# Patient Record
Sex: MALE | Race: OTHER | URBAN - METROPOLITAN AREA
[De-identification: names, ages, dates, MRNs, and addresses within clinical notes are randomized per-mention and may not be internally consistent; named-entity substitution may affect disease eponyms.]

---

## 2017-11-06 ENCOUNTER — INPATIENT (INPATIENT)
Facility: HOSPITAL | Age: 37
LOS: 1 days | Discharge: HOME | End: 2017-11-08
Attending: INTERNAL MEDICINE | Admitting: INTERNAL MEDICINE
Payer: MEDICAID

## 2017-11-06 ENCOUNTER — TRANSCRIPTION ENCOUNTER (OUTPATIENT)
Age: 37
End: 2017-11-06

## 2017-11-06 VITALS
SYSTOLIC BLOOD PRESSURE: 138 MMHG | TEMPERATURE: 98 F | DIASTOLIC BLOOD PRESSURE: 87 MMHG | HEART RATE: 90 BPM | OXYGEN SATURATION: 96 % | HEIGHT: 65.75 IN | RESPIRATION RATE: 21 BRPM | WEIGHT: 220.9 LBS

## 2017-11-06 DIAGNOSIS — N13.30 UNSPECIFIED HYDRONEPHROSIS: ICD-10-CM

## 2017-11-06 DIAGNOSIS — N23 UNSPECIFIED RENAL COLIC: ICD-10-CM

## 2017-11-06 DIAGNOSIS — N13.2 HYDRONEPHROSIS WITH RENAL AND URETERAL CALCULOUS OBSTRUCTION: ICD-10-CM

## 2017-11-06 DIAGNOSIS — N20.0 CALCULUS OF KIDNEY: ICD-10-CM

## 2017-11-06 LAB
ALBUMIN SERPL ELPH-MCNC: 4 G/DL — SIGNIFICANT CHANGE UP (ref 3.3–5)
ALP SERPL-CCNC: 141 U/L — HIGH (ref 40–120)
ALT FLD-CCNC: 153 U/L — HIGH (ref 12–78)
ANION GAP SERPL CALC-SCNC: 8 MMOL/L — SIGNIFICANT CHANGE UP (ref 5–17)
APPEARANCE UR: CLEAR — SIGNIFICANT CHANGE UP
AST SERPL-CCNC: 55 U/L — HIGH (ref 15–37)
BASOPHILS # BLD AUTO: 0.1 K/UL — SIGNIFICANT CHANGE UP (ref 0–0.2)
BASOPHILS NFR BLD AUTO: 0.8 % — SIGNIFICANT CHANGE UP (ref 0–2)
BILIRUB SERPL-MCNC: 0.6 MG/DL — SIGNIFICANT CHANGE UP (ref 0.2–1.2)
BILIRUB UR-MCNC: NEGATIVE — SIGNIFICANT CHANGE UP
BUN SERPL-MCNC: 22 MG/DL — SIGNIFICANT CHANGE UP (ref 7–23)
CALCIUM SERPL-MCNC: 9.1 MG/DL — SIGNIFICANT CHANGE UP (ref 8.5–10.1)
CHLORIDE SERPL-SCNC: 111 MMOL/L — HIGH (ref 96–108)
CO2 SERPL-SCNC: 22 MMOL/L — SIGNIFICANT CHANGE UP (ref 22–31)
COLOR SPEC: YELLOW — SIGNIFICANT CHANGE UP
CREAT SERPL-MCNC: 1.47 MG/DL — HIGH (ref 0.5–1.3)
DIFF PNL FLD: ABNORMAL
EOSINOPHIL # BLD AUTO: 0.1 K/UL — SIGNIFICANT CHANGE UP (ref 0–0.5)
EOSINOPHIL NFR BLD AUTO: 1.2 % — SIGNIFICANT CHANGE UP (ref 0–6)
EPI CELLS # UR: SIGNIFICANT CHANGE UP
GLUCOSE SERPL-MCNC: 104 MG/DL — HIGH (ref 70–99)
GLUCOSE UR QL: NEGATIVE MG/DL — SIGNIFICANT CHANGE UP
HCT VFR BLD CALC: 45.3 % — SIGNIFICANT CHANGE UP (ref 39–50)
HGB BLD-MCNC: 14.9 G/DL — SIGNIFICANT CHANGE UP (ref 13–17)
KETONES UR-MCNC: NEGATIVE — SIGNIFICANT CHANGE UP
LEUKOCYTE ESTERASE UR-ACNC: NEGATIVE — SIGNIFICANT CHANGE UP
LIDOCAIN IGE QN: 100 U/L — SIGNIFICANT CHANGE UP (ref 73–393)
LYMPHOCYTES # BLD AUTO: 1.5 K/UL — SIGNIFICANT CHANGE UP (ref 1–3.3)
LYMPHOCYTES # BLD AUTO: 13.5 % — SIGNIFICANT CHANGE UP (ref 13–44)
MCHC RBC-ENTMCNC: 29.7 PG — SIGNIFICANT CHANGE UP (ref 27–34)
MCHC RBC-ENTMCNC: 32.9 GM/DL — SIGNIFICANT CHANGE UP (ref 32–36)
MCV RBC AUTO: 90.3 FL — SIGNIFICANT CHANGE UP (ref 80–100)
MONOCYTES # BLD AUTO: 0.7 K/UL — SIGNIFICANT CHANGE UP (ref 0–0.9)
MONOCYTES NFR BLD AUTO: 6.4 % — SIGNIFICANT CHANGE UP (ref 2–14)
NEUTROPHILS # BLD AUTO: 8.7 K/UL — HIGH (ref 1.8–7.4)
NEUTROPHILS NFR BLD AUTO: 78 % — HIGH (ref 43–77)
NITRITE UR-MCNC: NEGATIVE — SIGNIFICANT CHANGE UP
PH UR: 5 — SIGNIFICANT CHANGE UP (ref 5–8)
PLATELET # BLD AUTO: 201 K/UL — SIGNIFICANT CHANGE UP (ref 150–400)
POTASSIUM SERPL-MCNC: 4 MMOL/L — SIGNIFICANT CHANGE UP (ref 3.5–5.3)
POTASSIUM SERPL-SCNC: 4 MMOL/L — SIGNIFICANT CHANGE UP (ref 3.5–5.3)
PROT SERPL-MCNC: 7.8 GM/DL — SIGNIFICANT CHANGE UP (ref 6–8.3)
PROT UR-MCNC: NEGATIVE MG/DL — SIGNIFICANT CHANGE UP
RBC # BLD: 5.02 M/UL — SIGNIFICANT CHANGE UP (ref 4.2–5.8)
RBC # FLD: 12.4 % — SIGNIFICANT CHANGE UP (ref 11–15)
SODIUM SERPL-SCNC: 141 MMOL/L — SIGNIFICANT CHANGE UP (ref 135–145)
SP GR SPEC: 1.02 — SIGNIFICANT CHANGE UP (ref 1.01–1.02)
UROBILINOGEN FLD QL: NEGATIVE MG/DL — SIGNIFICANT CHANGE UP
WBC # BLD: 11.2 K/UL — HIGH (ref 3.8–10.5)
WBC # FLD AUTO: 11.2 K/UL — HIGH (ref 3.8–10.5)

## 2017-11-06 PROCEDURE — 74177 CT ABD & PELVIS W/CONTRAST: CPT | Mod: 26

## 2017-11-06 PROCEDURE — 99285 EMERGENCY DEPT VISIT HI MDM: CPT | Mod: 25

## 2017-11-06 RX ORDER — HYDROMORPHONE HYDROCHLORIDE 2 MG/ML
1 INJECTION INTRAMUSCULAR; INTRAVENOUS; SUBCUTANEOUS EVERY 4 HOURS
Qty: 0 | Refills: 0 | Status: DISCONTINUED | OUTPATIENT
Start: 2017-11-06 | End: 2017-11-07

## 2017-11-06 RX ORDER — TAMSULOSIN HYDROCHLORIDE 0.4 MG/1
0.4 CAPSULE ORAL AT BEDTIME
Qty: 0 | Refills: 0 | Status: DISCONTINUED | OUTPATIENT
Start: 2017-11-06 | End: 2017-11-07

## 2017-11-06 RX ORDER — TAMSULOSIN HYDROCHLORIDE 0.4 MG/1
0.4 CAPSULE ORAL ONCE
Qty: 0 | Refills: 0 | Status: COMPLETED | OUTPATIENT
Start: 2017-11-06 | End: 2017-11-06

## 2017-11-06 RX ORDER — IOHEXOL 300 MG/ML
30 INJECTION, SOLUTION INTRAVENOUS ONCE
Qty: 0 | Refills: 0 | Status: COMPLETED | OUTPATIENT
Start: 2017-11-06 | End: 2017-11-06

## 2017-11-06 RX ORDER — MORPHINE SULFATE 50 MG/1
4 CAPSULE, EXTENDED RELEASE ORAL ONCE
Qty: 0 | Refills: 0 | Status: DISCONTINUED | OUTPATIENT
Start: 2017-11-06 | End: 2017-11-06

## 2017-11-06 RX ORDER — HYDROMORPHONE HYDROCHLORIDE 2 MG/ML
1 INJECTION INTRAMUSCULAR; INTRAVENOUS; SUBCUTANEOUS ONCE
Qty: 0 | Refills: 0 | Status: DISCONTINUED | OUTPATIENT
Start: 2017-11-06 | End: 2017-11-06

## 2017-11-06 RX ORDER — CIPROFLOXACIN LACTATE 400MG/40ML
400 VIAL (ML) INTRAVENOUS ONCE
Qty: 0 | Refills: 0 | Status: COMPLETED | OUTPATIENT
Start: 2017-11-06 | End: 2017-11-06

## 2017-11-06 RX ORDER — TAMSULOSIN HYDROCHLORIDE 0.4 MG/1
1 CAPSULE ORAL
Qty: 14 | Refills: 0 | OUTPATIENT
Start: 2017-11-06 | End: 2017-11-20

## 2017-11-06 RX ORDER — SODIUM CHLORIDE 9 MG/ML
1000 INJECTION INTRAMUSCULAR; INTRAVENOUS; SUBCUTANEOUS ONCE
Qty: 0 | Refills: 0 | Status: COMPLETED | OUTPATIENT
Start: 2017-11-06 | End: 2017-11-06

## 2017-11-06 RX ORDER — CIPROFLOXACIN LACTATE 400MG/40ML
VIAL (ML) INTRAVENOUS
Qty: 0 | Refills: 0 | Status: DISCONTINUED | OUTPATIENT
Start: 2017-11-06 | End: 2017-11-07

## 2017-11-06 RX ORDER — KETOROLAC TROMETHAMINE 30 MG/ML
30 SYRINGE (ML) INJECTION ONCE
Qty: 0 | Refills: 0 | Status: DISCONTINUED | OUTPATIENT
Start: 2017-11-06 | End: 2017-11-06

## 2017-11-06 RX ORDER — CIPROFLOXACIN LACTATE 400MG/40ML
400 VIAL (ML) INTRAVENOUS EVERY 12 HOURS
Qty: 0 | Refills: 0 | Status: CANCELLED | OUTPATIENT
Start: 2017-11-07 | End: 2017-11-07

## 2017-11-06 RX ORDER — SODIUM CHLORIDE 9 MG/ML
1000 INJECTION INTRAMUSCULAR; INTRAVENOUS; SUBCUTANEOUS
Qty: 0 | Refills: 0 | Status: DISCONTINUED | OUTPATIENT
Start: 2017-11-06 | End: 2017-11-07

## 2017-11-06 RX ORDER — IBUPROFEN 200 MG
1 TABLET ORAL
Qty: 28 | Refills: 0 | OUTPATIENT
Start: 2017-11-06 | End: 2017-11-13

## 2017-11-06 RX ADMIN — MORPHINE SULFATE 4 MILLIGRAM(S): 50 CAPSULE, EXTENDED RELEASE ORAL at 14:07

## 2017-11-06 RX ADMIN — Medication 200 MILLIGRAM(S): at 21:22

## 2017-11-06 RX ADMIN — HYDROMORPHONE HYDROCHLORIDE 1 MILLIGRAM(S): 2 INJECTION INTRAMUSCULAR; INTRAVENOUS; SUBCUTANEOUS at 18:04

## 2017-11-06 RX ADMIN — MORPHINE SULFATE 4 MILLIGRAM(S): 50 CAPSULE, EXTENDED RELEASE ORAL at 12:32

## 2017-11-06 RX ADMIN — SODIUM CHLORIDE 1000 MILLILITER(S): 9 INJECTION INTRAMUSCULAR; INTRAVENOUS; SUBCUTANEOUS at 12:16

## 2017-11-06 RX ADMIN — TAMSULOSIN HYDROCHLORIDE 0.4 MILLIGRAM(S): 0.4 CAPSULE ORAL at 12:06

## 2017-11-06 RX ADMIN — MORPHINE SULFATE 4 MILLIGRAM(S): 50 CAPSULE, EXTENDED RELEASE ORAL at 11:44

## 2017-11-06 RX ADMIN — MORPHINE SULFATE 4 MILLIGRAM(S): 50 CAPSULE, EXTENDED RELEASE ORAL at 07:53

## 2017-11-06 RX ADMIN — IOHEXOL 30 MILLILITER(S): 300 INJECTION, SOLUTION INTRAVENOUS at 07:57

## 2017-11-06 RX ADMIN — MORPHINE SULFATE 4 MILLIGRAM(S): 50 CAPSULE, EXTENDED RELEASE ORAL at 15:22

## 2017-11-06 RX ADMIN — SODIUM CHLORIDE 100 MILLILITER(S): 9 INJECTION INTRAMUSCULAR; INTRAVENOUS; SUBCUTANEOUS at 18:04

## 2017-11-06 RX ADMIN — HYDROMORPHONE HYDROCHLORIDE 1 MILLIGRAM(S): 2 INJECTION INTRAMUSCULAR; INTRAVENOUS; SUBCUTANEOUS at 21:37

## 2017-11-06 RX ADMIN — HYDROMORPHONE HYDROCHLORIDE 1 MILLIGRAM(S): 2 INJECTION INTRAMUSCULAR; INTRAVENOUS; SUBCUTANEOUS at 22:11

## 2017-11-06 RX ADMIN — TAMSULOSIN HYDROCHLORIDE 0.4 MILLIGRAM(S): 0.4 CAPSULE ORAL at 21:23

## 2017-11-06 RX ADMIN — MORPHINE SULFATE 4 MILLIGRAM(S): 50 CAPSULE, EXTENDED RELEASE ORAL at 10:17

## 2017-11-06 NOTE — CONSULT NOTE ADULT - ASSESSMENT
Left flank pain secondary to left upper 5 mm stone necessitating parenteral medication, admitted for definitive treatment.

## 2017-11-06 NOTE — CONSULT NOTE ADULT - PROBLEM SELECTOR RECOMMENDATION 2
ureteroscopy , possible removal of stone abd insertion of stent.  Medical expulsive therapy ureteroscopy , possible removal of stone abd insertion of stent.  Medical expulsive therapy.  Scheduled for left ureterolithotripsy and insertion of stent tomorrow
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
## 2017-11-06 NOTE — H&P ADULT - NSHPPHYSICALEXAM_GEN_ALL_CORE
GENERAL: NAD well-developed  HEAD:  Atraumatic, Normocephalic  EYES: EOMI, PERRLA, conjunctiva and sclera clear  ENMT: No tonsillar erythema, exudates, or enlargement; Moist mucous membranes, Good dentition, No lesions  NECK: Supple, No JVD, Normal thyroid  NERVOUS SYSTEM:  Alert & Oriented X3, Good concentration; Motor Strength 5/5 B/L upper and lower extremities; DTRs 2+ intact and symmetric  CHEST/LUNG: Clear to percussion bilaterally; No rales, rhonchi, wheezing, or rubs  HEART: Regular rate and rhythm; No murmurs, rubs, or gallops  ABDOMEN: Soft, tender to right flank  Nondistended; Bowel sounds present  EXTREMITIES:  2+ Peripheral Pulses, No clubbing, cyanosis, or edema  LYMPH: No lymphadenopathy   SKIN: No rashes or lesions

## 2017-11-06 NOTE — ED PROVIDER NOTE - PROGRESS NOTE DETAILS
Pt has 5 mm kidney stone, given flomax, and referred to urology. Patient is pain free, feels like he wants to go home.

## 2017-11-06 NOTE — ED ADULT NURSE NOTE - OBJECTIVE STATEMENT
patient is a 37yr old male c/o left abdominal pain that started saturday 1pm that became worst today. denies n/v

## 2017-11-06 NOTE — H&P ADULT - HISTORY OF PRESENT ILLNESS
36 yo gentleman with a pmhx of kidney stones who presents to the ED with L. lower abd/ flank pain. it started yesterday,has been intermittent. Feels similar to prior kidney stone he had on the R. side. No dysuria, no hematuria. No n/v/d, no chest pain, no short of breath.rted yesterday, has been intermittent. Feels similar to prior kidney stone he had on the R. side. No dysuria, no hematuria. No n/v/d, no chest pain, no sob.

## 2017-11-06 NOTE — H&P ADULT - NSHPLABSRESULTS_GEN_ALL_CORE
14.9   11.2  )-----------( 201      ( 06 Nov 2017 07:39 )             45.3   11-06    141  |  111<H>  |  22  ----------------------------<  104<H>  4.0   |  22  |  1.47<H>    Ca    9.1      06 Nov 2017 07:39    TPro  7.8  /  Alb  4.0  /  TBili  0.6  /  DBili  x   /  AST  55<H>  /  ALT  153<H>  /  AlkPhos  141<H>  11-06      < from: CT Abdomen and Pelvis w/ Oral Cont and w/ IV Cont (11.06.17 @ 11:03) >    MPRESSION: Mild left hydronephrosis due to a 5 mm calculus within the   proximal left ureter. Additional 4 mm nonobstructing left renal calculus.

## 2017-11-06 NOTE — ED PROVIDER NOTE - OBJECTIVE STATEMENT
Pt is a 36 yo gentleman with a pmhx of kidney stones who presents to the ED with L. lower abd/ flank pain. it started yesterday, has been intermittent. Feels similar to prior kidney stone he had on the R. side. No dysuria, no hematuria. No n/v/d, no chest pain, no sob.

## 2017-11-07 ENCOUNTER — RESULT REVIEW (OUTPATIENT)
Age: 37
End: 2017-11-07

## 2017-11-07 DIAGNOSIS — Z29.9 ENCOUNTER FOR PROPHYLACTIC MEASURES, UNSPECIFIED: ICD-10-CM

## 2017-11-07 DIAGNOSIS — N17.0 ACUTE KIDNEY FAILURE WITH TUBULAR NECROSIS: ICD-10-CM

## 2017-11-07 LAB
ANION GAP SERPL CALC-SCNC: 10 MMOL/L — SIGNIFICANT CHANGE UP (ref 5–17)
BUN SERPL-MCNC: 17 MG/DL — SIGNIFICANT CHANGE UP (ref 7–23)
CALCIUM SERPL-MCNC: 8.6 MG/DL — SIGNIFICANT CHANGE UP (ref 8.5–10.1)
CHLORIDE SERPL-SCNC: 105 MMOL/L — SIGNIFICANT CHANGE UP (ref 96–108)
CO2 SERPL-SCNC: 22 MMOL/L — SIGNIFICANT CHANGE UP (ref 22–31)
CREAT SERPL-MCNC: 1.52 MG/DL — HIGH (ref 0.5–1.3)
CULTURE RESULTS: NO GROWTH — SIGNIFICANT CHANGE UP
GLUCOSE SERPL-MCNC: 114 MG/DL — HIGH (ref 70–99)
HCT VFR BLD CALC: 37.9 % — LOW (ref 39–50)
HGB BLD-MCNC: 13 G/DL — SIGNIFICANT CHANGE UP (ref 13–17)
MCHC RBC-ENTMCNC: 30.3 PG — SIGNIFICANT CHANGE UP (ref 27–34)
MCHC RBC-ENTMCNC: 34.2 GM/DL — SIGNIFICANT CHANGE UP (ref 32–36)
MCV RBC AUTO: 88.7 FL — SIGNIFICANT CHANGE UP (ref 80–100)
PLATELET # BLD AUTO: 194 K/UL — SIGNIFICANT CHANGE UP (ref 150–400)
POTASSIUM SERPL-MCNC: 3.6 MMOL/L — SIGNIFICANT CHANGE UP (ref 3.5–5.3)
POTASSIUM SERPL-SCNC: 3.6 MMOL/L — SIGNIFICANT CHANGE UP (ref 3.5–5.3)
RBC # BLD: 4.28 M/UL — SIGNIFICANT CHANGE UP (ref 4.2–5.8)
RBC # FLD: 12.8 % — SIGNIFICANT CHANGE UP (ref 11–15)
SODIUM SERPL-SCNC: 137 MMOL/L — SIGNIFICANT CHANGE UP (ref 135–145)
SPECIMEN SOURCE: SIGNIFICANT CHANGE UP
WBC # BLD: 8.3 K/UL — SIGNIFICANT CHANGE UP (ref 3.8–10.5)
WBC # FLD AUTO: 8.3 K/UL — SIGNIFICANT CHANGE UP (ref 3.8–10.5)

## 2017-11-07 PROCEDURE — 99232 SBSQ HOSP IP/OBS MODERATE 35: CPT

## 2017-11-07 PROCEDURE — 88300 SURGICAL PATH GROSS: CPT | Mod: 26

## 2017-11-07 RX ORDER — ACETAMINOPHEN 500 MG
650 TABLET ORAL ONCE
Qty: 0 | Refills: 0 | Status: COMPLETED | OUTPATIENT
Start: 2017-11-07 | End: 2017-11-07

## 2017-11-07 RX ORDER — ACETAMINOPHEN 500 MG
650 TABLET ORAL EVERY 6 HOURS
Qty: 0 | Refills: 0 | Status: DISCONTINUED | OUTPATIENT
Start: 2017-11-07 | End: 2017-11-08

## 2017-11-07 RX ORDER — SODIUM CHLORIDE 9 MG/ML
1000 INJECTION, SOLUTION INTRAVENOUS
Qty: 0 | Refills: 0 | Status: DISCONTINUED | OUTPATIENT
Start: 2017-11-07 | End: 2017-11-07

## 2017-11-07 RX ORDER — HYDROMORPHONE HYDROCHLORIDE 2 MG/ML
1 INJECTION INTRAMUSCULAR; INTRAVENOUS; SUBCUTANEOUS EVERY 4 HOURS
Qty: 0 | Refills: 0 | Status: DISCONTINUED | OUTPATIENT
Start: 2017-11-07 | End: 2017-11-08

## 2017-11-07 RX ORDER — SODIUM CHLORIDE 9 MG/ML
1000 INJECTION INTRAMUSCULAR; INTRAVENOUS; SUBCUTANEOUS
Qty: 0 | Refills: 0 | Status: DISCONTINUED | OUTPATIENT
Start: 2017-11-07 | End: 2017-11-08

## 2017-11-07 RX ORDER — CIPROFLOXACIN LACTATE 400MG/40ML
400 VIAL (ML) INTRAVENOUS EVERY 12 HOURS
Qty: 0 | Refills: 0 | Status: DISCONTINUED | OUTPATIENT
Start: 2017-11-07 | End: 2017-11-08

## 2017-11-07 RX ORDER — FENTANYL CITRATE 50 UG/ML
50 INJECTION INTRAVENOUS
Qty: 0 | Refills: 0 | Status: DISCONTINUED | OUTPATIENT
Start: 2017-11-07 | End: 2017-11-07

## 2017-11-07 RX ORDER — FUROSEMIDE 40 MG
10 TABLET ORAL ONCE
Qty: 0 | Refills: 0 | Status: DISCONTINUED | OUTPATIENT
Start: 2017-11-07 | End: 2017-11-08

## 2017-11-07 RX ORDER — TAMSULOSIN HYDROCHLORIDE 0.4 MG/1
0.4 CAPSULE ORAL AT BEDTIME
Qty: 0 | Refills: 0 | Status: DISCONTINUED | OUTPATIENT
Start: 2017-11-07 | End: 2017-11-08

## 2017-11-07 RX ADMIN — HYDROMORPHONE HYDROCHLORIDE 1 MILLIGRAM(S): 2 INJECTION INTRAMUSCULAR; INTRAVENOUS; SUBCUTANEOUS at 01:20

## 2017-11-07 RX ADMIN — Medication 200 MILLIGRAM(S): at 18:15

## 2017-11-07 RX ADMIN — FENTANYL CITRATE 50 MICROGRAM(S): 50 INJECTION INTRAVENOUS at 12:45

## 2017-11-07 RX ADMIN — Medication 650 MILLIGRAM(S): at 20:12

## 2017-11-07 RX ADMIN — HYDROMORPHONE HYDROCHLORIDE 1 MILLIGRAM(S): 2 INJECTION INTRAMUSCULAR; INTRAVENOUS; SUBCUTANEOUS at 07:24

## 2017-11-07 RX ADMIN — SODIUM CHLORIDE 75 MILLILITER(S): 9 INJECTION, SOLUTION INTRAVENOUS at 12:24

## 2017-11-07 RX ADMIN — Medication 650 MILLIGRAM(S): at 04:07

## 2017-11-07 RX ADMIN — Medication 650 MILLIGRAM(S): at 05:00

## 2017-11-07 RX ADMIN — TAMSULOSIN HYDROCHLORIDE 0.4 MILLIGRAM(S): 0.4 CAPSULE ORAL at 21:49

## 2017-11-07 RX ADMIN — SODIUM CHLORIDE 100 MILLILITER(S): 9 INJECTION INTRAMUSCULAR; INTRAVENOUS; SUBCUTANEOUS at 18:17

## 2017-11-07 RX ADMIN — FENTANYL CITRATE 50 MICROGRAM(S): 50 INJECTION INTRAVENOUS at 12:28

## 2017-11-07 RX ADMIN — HYDROMORPHONE HYDROCHLORIDE 1 MILLIGRAM(S): 2 INJECTION INTRAMUSCULAR; INTRAVENOUS; SUBCUTANEOUS at 06:54

## 2017-11-07 RX ADMIN — HYDROMORPHONE HYDROCHLORIDE 1 MILLIGRAM(S): 2 INJECTION INTRAMUSCULAR; INTRAVENOUS; SUBCUTANEOUS at 00:48

## 2017-11-07 NOTE — PROGRESS NOTE ADULT - PROBLEM SELECTOR PLAN 1
post op Ureteroscopy of left ureter with lithotripsy, cont with IVF and IV abx, will f/u renal function and Urology recommendations.

## 2017-11-07 NOTE — BRIEF OPERATIVE NOTE - PROCEDURE
<<-----Click on this checkbox to enter Procedure Ureteroscopy of right ureter with lithotripsy  11/07/2017  removal of stones and insertion of stent  Active  [facility] Ureteroscopy of left ureter with lithotripsy  11/07/2017  removal of stones and insertion of stent  Active  [facility]

## 2017-11-07 NOTE — PROGRESS NOTE ADULT - SUBJECTIVE AND OBJECTIVE BOX
Patient is a 37y old  Male who presents with a chief complaint of left flank pain (2017 15:23)      INTERVAL HPI/OVERNIGHT EVENTS: Pt was seen post-op Ureteroscopy of left ureter with lithotripsy, tolerated procedure well, pt has minimal discomfort otherwise he denies any other symptom. indwelling john    MEDICATIONS  (STANDING):  ciprofloxacin   IVPB 400 milliGRAM(s) IV Intermittent every 12 hours  furosemide   Injectable 10 milliGRAM(s) IV Push once  sodium chloride 0.9%. 1000 milliLiter(s) (100 mL/Hr) IV Continuous <Continuous>  tamsulosin 0.4 milliGRAM(s) Oral at bedtime    MEDICATIONS  (PRN):  acetaminophen   Tablet 650 milliGRAM(s) Oral every 6 hours PRN For Temp greater than 38 C (100.4 F)  HYDROmorphone  Injectable 1 milliGRAM(s) IV Push every 4 hours PRN Moderate Pain (4 - 6)      Allergies    No Known Allergies    Intolerances        REVIEW OF SYSTEMS:    CONSTITUTIONAL: No fever, generalized weakness, Fatigue, weight loss  EYES: No eye pain, visual disturbances, or discharge  ENMT:  No difficulty hearing, tinnitus, vertigo; No sinus or throat pain  NECK: No pain or stiffness  RESPIRATORY: No shortness of breath,  cough, wheezing, chills or hemoptysis;   CARDIOVASCULAR: No chest pain, palpitations, or leg swelling  GASTROINTESTINAL: No abdominal pain. No nausea, vomiting, diarrhea or constipation. No melena or hematochezia.  GENITOURINARY: No dysuria, frequency, hematuria, or incontinence  NEUROLOGICAL: No headaches, Dizziness, memory loss, loss of strength, numbness, or tremors  SKIN: No itching, burning, rashes, or lesions   MUSCULOSKELETAL: No joint pain or swelling; No muscle, back, or extremity pain  PSYCHIATRIC: No depression, anxiety, mood swings, or difficulty sleeping  HEME/LYMPH: No easy bruising, or bleeding gums  ALLERGY AND IMMUNOLOGIC: No hives or eczema    Vital Signs Last 24 Hrs  T(C): 38.1 (2017 20:50), Max: 38.1 (2017 19:54)  T(F): 100.6 (2017 20:50), Max: 100.6 (2017 19:54)  HR: 110 (2017 20:50) (80 - 110)  BP: 137/82 (2017 20:50) (117/66 - 148/86)  BP(mean): --  RR: 15 (2017 20:50) (12 - 18)  SpO2: 99% (2017 20:50) (92% - 99%)    PHYSICAL EXAM:    GENERAL: NAD, well-groomed, well-developed  HEAD:  Atraumatic, Normocephalic  EYES: EOMI, PERRLA, conjunctiva and sclera clear  ENMT: Moist mucous membranes  NECK: Supple, No JVD, Normal thyroid  CHEST/LUNG: Clear to percussion bilaterally; No rales, rhonchi, wheezing, or rubs  HEART: Regular rate and rhythm; No murmurs, rubs, or gallops  ABDOMEN: Soft, Nontender, Nondistended; Bowel sounds present  EXTREMITIES:  +john, 2+ Peripheral Pulses, No clubbing, cyanosis, or edema  LYMPH: No lymphadenopathy noted  SKIN: No rashes or lesions  NERVOUS SYSTEM:  Alert & Oriented X3, Good concentration; Motor Strength 5/5 B/L upper and lower extremities; DTRs 2+ intact and symmetric    LABS:                        13.0   8.3   )-----------( 194      ( 2017 08:07 )             37.9         137  |  105  |  17  ----------------------------<  114<H>  3.6   |  22  |  1.52<H>    Ca    8.6      2017 08:05    TPro  7.8  /  Alb  4.0  /  TBili  0.6  /  DBili  x   /  AST  55<H>  /  ALT  153<H>  /  AlkPhos  141<H>        Urinalysis Basic - ( 2017 07:39 )    Color: Yellow / Appearance: Clear / S.020 / pH: x  Gluc: x / Ketone: Negative  / Bili: Negative / Urobili: Negative mg/dL   Blood: x / Protein: Negative mg/dL / Nitrite: Negative   Leuk Esterase: Negative / RBC: x / WBC x   Sq Epi: x / Non Sq Epi: Few / Bacteria: x      CAPILLARY BLOOD GLUCOSE            Culture - Urine (collected 17)  Source: .Urine Clean Catch (Midstream)  Final Report (17):    No growth        RADIOLOGY & ADDITIONAL TESTS:          Imaging Personally Reviewed:  [x ] YES  [ ] NO    Consultant(s) Notes Reviewed:  [x ] YES  [ ] NO    Care Discussed with Consultants/Other Providers [x ] YES  [ ] NO

## 2017-11-08 ENCOUNTER — TRANSCRIPTION ENCOUNTER (OUTPATIENT)
Age: 37
End: 2017-11-08

## 2017-11-08 VITALS
DIASTOLIC BLOOD PRESSURE: 95 MMHG | TEMPERATURE: 97 F | SYSTOLIC BLOOD PRESSURE: 142 MMHG | OXYGEN SATURATION: 96 % | RESPIRATION RATE: 18 BRPM | HEART RATE: 73 BPM

## 2017-11-08 LAB
ANION GAP SERPL CALC-SCNC: 13 MMOL/L — SIGNIFICANT CHANGE UP (ref 5–17)
BUN SERPL-MCNC: 13 MG/DL — SIGNIFICANT CHANGE UP (ref 7–23)
CALCIUM SERPL-MCNC: 8.8 MG/DL — SIGNIFICANT CHANGE UP (ref 8.5–10.1)
CHLORIDE SERPL-SCNC: 107 MMOL/L — SIGNIFICANT CHANGE UP (ref 96–108)
CO2 SERPL-SCNC: 21 MMOL/L — LOW (ref 22–31)
CREAT SERPL-MCNC: 1.18 MG/DL — SIGNIFICANT CHANGE UP (ref 0.5–1.3)
GLUCOSE SERPL-MCNC: 119 MG/DL — HIGH (ref 70–99)
HCT VFR BLD CALC: 40.2 % — SIGNIFICANT CHANGE UP (ref 39–50)
HGB BLD-MCNC: 13.4 G/DL — SIGNIFICANT CHANGE UP (ref 13–17)
MCHC RBC-ENTMCNC: 30.1 PG — SIGNIFICANT CHANGE UP (ref 27–34)
MCHC RBC-ENTMCNC: 33.3 GM/DL — SIGNIFICANT CHANGE UP (ref 32–36)
MCV RBC AUTO: 90.4 FL — SIGNIFICANT CHANGE UP (ref 80–100)
PLATELET # BLD AUTO: 195 K/UL — SIGNIFICANT CHANGE UP (ref 150–400)
POTASSIUM SERPL-MCNC: 3.6 MMOL/L — SIGNIFICANT CHANGE UP (ref 3.5–5.3)
POTASSIUM SERPL-SCNC: 3.6 MMOL/L — SIGNIFICANT CHANGE UP (ref 3.5–5.3)
RBC # BLD: 4.45 M/UL — SIGNIFICANT CHANGE UP (ref 4.2–5.8)
RBC # FLD: 12.2 % — SIGNIFICANT CHANGE UP (ref 11–15)
SODIUM SERPL-SCNC: 141 MMOL/L — SIGNIFICANT CHANGE UP (ref 135–145)
SURGICAL PATHOLOGY FINAL REPORT - CH: SIGNIFICANT CHANGE UP
WBC # BLD: 7.8 K/UL — SIGNIFICANT CHANGE UP (ref 3.8–10.5)
WBC # FLD AUTO: 7.8 K/UL — SIGNIFICANT CHANGE UP (ref 3.8–10.5)

## 2017-11-08 PROCEDURE — 99238 HOSP IP/OBS DSCHRG MGMT 30/<: CPT

## 2017-11-08 RX ORDER — MOXIFLOXACIN HYDROCHLORIDE TABLETS, 400 MG 400 MG/1
1 TABLET, FILM COATED ORAL
Qty: 6 | Refills: 0 | OUTPATIENT
Start: 2017-11-08 | End: 2017-11-11

## 2017-11-08 RX ADMIN — SODIUM CHLORIDE 100 MILLILITER(S): 9 INJECTION INTRAMUSCULAR; INTRAVENOUS; SUBCUTANEOUS at 05:32

## 2017-11-08 RX ADMIN — Medication 200 MILLIGRAM(S): at 05:32

## 2017-11-08 NOTE — DISCHARGE NOTE ADULT - MEDICATION SUMMARY - MEDICATIONS TO TAKE
I will START or STAY ON the medications listed below when I get home from the hospital:    Flomax 0.4 mg oral capsule  -- 1 cap(s) by mouth once a day  -- It is very important that you take or use this exactly as directed.  Do not skip doses or discontinue unless directed by your doctor.  May cause drowsiness.  Alcohol may intensify this effect.  Use care when operating dangerous machinery.  Some non-prescription drugs may aggravate your condition.  Read all labels carefully.  If a warning appears, check with your doctor before taking.  Swallow whole.  Do not crush.  Take with food or milk.    -- Indication: For Ureteral stone with hydronephrosis    Cipro 500 mg oral tablet  -- 1 tab(s) by mouth every 12 hours  -- Indication: For Ureteral stone with hydronephrosis

## 2017-11-08 NOTE — DISCHARGE NOTE ADULT - MEDICATION SUMMARY - MEDICATIONS TO STOP TAKING
I will STOP taking the medications listed below when I get home from the hospital:    oxyCODONE-acetaminophen 5 mg-325 mg oral tablet  -- 1 tab(s) by mouth 3 times a day MDD:3  -- Caution federal law prohibits the transfer of this drug to any person other  than the person for whom it was prescribed.  May cause drowsiness.  Alcohol may intensify this effect.  Use care when operating dangerous machinery.  This prescription cannot be refilled.  This product contains acetaminophen.  Do not use  with any other product containing acetaminophen to prevent possible liver damage.  Using more of this medication than prescribed may cause serious breathing problems.     mg oral tablet  -- 1 tab(s) by mouth every 6 hours  -- Do not take this drug if you are pregnant.  It is very important that you take or use this exactly as directed.  Do not skip doses or discontinue unless directed by your doctor.  May cause drowsiness or dizziness.  Obtain medical advice before taking any non-prescription drugs as some may affect the action of this medication.  Take with food or milk.

## 2017-11-08 NOTE — DISCHARGE NOTE ADULT - HOSPITAL COURSE
36 yo gentleman with a pmhx of kidney stones who presents to the ED with L. lower abd/ flank pain. it started yesterday,has been intermittent. Feels similar to prior kidney stone he had on the R. side. No dysuria, no hematuria. No n/v/d, no chest pain, no short of breath.rted yesterday, has been intermittent. In hospital, pt is s/p stent placement and lithotripsy. john discontinued, pt is voiding freely. received iv cipro.

## 2017-11-08 NOTE — DISCHARGE NOTE ADULT - PLAN OF CARE
maintain optimal kidney functioning resolved  cont flomax, cipro po x 3 days  f/u with urology, Dr. Chirinos resolved.  cont flomax, cipro

## 2017-11-08 NOTE — CHART NOTE - NSCHARTNOTEFT_GEN_A_CORE
To whom it may concern:     Mr. Kevin, Ayden Y [birth year] has been under inpatient care at [facility] since November 6, 2017 and is discharged on November 8, 2017. Please allow Mr. Kevin to recover and to return to work on Monday November 13, 2017, and he may resume all activities at his discretion. Thank You      If there are any questions please do not hesitate to call    Dr. Jayashree Rivera M.D   [facility]  [phone]

## 2017-11-08 NOTE — DISCHARGE NOTE ADULT - CARE PROVIDER_API CALL
Tommie Chirinos), Urology  [address] [place] [address]  Phone: [phone]  Fax: [phone]

## 2017-11-08 NOTE — DISCHARGE NOTE ADULT - PATIENT PORTAL LINK FT
“You can access the Follow[employer] Patient Portal, offered by [facility], by registering with the following website: [email or web address]”

## 2017-11-08 NOTE — PROGRESS NOTE ADULT - SUBJECTIVE AND OBJECTIVE BOX
Patient seen and examined bedside resting comfortably. Admits to slight pain when urinating.     T(F): 97.6 (11-08-17 @ 05:14), Max: 100.7 (11-07-17 @ 22:10)  HR: 66 (11-08-17 @ 05:14) (66 - 110)  BP: 133/89 (11-08-17 @ 05:14) (118/74 - 148/86)  RR: 18 (11-08-17 @ 05:14) (12 - 18)  SpO2: 96% (11-08-17 @ 05:14) (92% - 99%)    PHYSICAL EXAM:  General: NAD, WDWN  Neuro:  Alert & oriented x 3  CV: +S1S2 regular rate and rhythm  Lung: clear to ausculation bilaterally, respirations nonlabored, good inspiratory effort  Abdomen: soft, NT/ND. Normactive BS  : No suprapubic tenderness. Voiding well.     LABS:                        13.4   7.8   )-----------( 195      ( 08 Nov 2017 06:37 )             40.2     11-08    141  |  107  |  13  ----------------------------<  119<H>  3.6   |  21<L>  |  1.18    Ca    8.8      08 Nov 2017 06:37    I&O's Detail    07 Nov 2017 07:01  -  08 Nov 2017 07:00  --------------------------------------------------------  IN:    lactated ringers.: 225 mL    Oral Fluid: 240 mL    sodium chloride 0.9%.: 1000 mL    Solution: 700 mL  Total IN: 2165 mL    OUT:    Indwelling Catheter - Urethral: 300 mL    Voided: 750 mL  Total OUT: 1050 mL    Total NET: 1115 mL    08 Nov 2017 07:01  -  08 Nov 2017 11:37  --------------------------------------------------------  IN:    Oral Fluid: 360 mL  Total IN: 360 mL    OUT:  Total OUT: 0 mL    Total NET: 360 mL    Impression: 37y Male s/p left ureteroscopy, left stone extraction, and insertion of left ureteral stent secondary to HUDSON and left hydronephrosis from ureteral stone POD#1. Voiding.     Plan:  -BUN/Cr improving, continue to monitor  -voiding well  -continue antibiotics per medicine  -continue current medical management  - stable for discharge with outpatient follow up with Dr. Chirinos

## 2017-11-08 NOTE — DISCHARGE NOTE ADULT - CARE PLAN
Principal Discharge DX:	Acute renal failure with tubular necrosis  Goal:	maintain optimal kidney functioning  Instructions for follow-up, activity and diet:	resolved  cont flomax, cipro po x 3 days  f/u with urology, Dr. Chirinos  Secondary Diagnosis:	Kidney stone  Instructions for follow-up, activity and diet:	resolved.  cont flomax, cipro

## 2017-11-10 LAB — COMPN STONE: SIGNIFICANT CHANGE UP

## 2017-11-13 DIAGNOSIS — N13.2 HYDRONEPHROSIS WITH RENAL AND URETERAL CALCULOUS OBSTRUCTION: ICD-10-CM

## 2017-11-13 DIAGNOSIS — N23 UNSPECIFIED RENAL COLIC: ICD-10-CM

## 2021-07-17 ENCOUNTER — APPOINTMENT (OUTPATIENT)
Dept: DISASTER EMERGENCY | Facility: OTHER | Age: 41
End: 2021-07-17

## 2021-07-24 ENCOUNTER — APPOINTMENT (OUTPATIENT)
Dept: DISASTER EMERGENCY | Facility: OTHER | Age: 41
End: 2021-07-24

## 2021-08-07 ENCOUNTER — APPOINTMENT (OUTPATIENT)
Dept: DISASTER EMERGENCY | Facility: OTHER | Age: 41
End: 2021-08-07

## 2021-08-14 ENCOUNTER — APPOINTMENT (OUTPATIENT)
Dept: DISASTER EMERGENCY | Facility: OTHER | Age: 41
End: 2021-08-14

## 2022-01-04 PROBLEM — Z00.00 ENCOUNTER FOR PREVENTIVE HEALTH EXAMINATION: Status: ACTIVE | Noted: 2022-01-04

## 2022-01-06 ENCOUNTER — APPOINTMENT (OUTPATIENT)
Dept: ORTHOPEDIC SURGERY | Facility: CLINIC | Age: 42
End: 2022-01-06

## 2022-01-10 ENCOUNTER — APPOINTMENT (OUTPATIENT)
Dept: ORTHOPEDIC SURGERY | Facility: CLINIC | Age: 42
End: 2022-01-10
Payer: OTHER MISCELLANEOUS

## 2022-01-10 VITALS
DIASTOLIC BLOOD PRESSURE: 105 MMHG | HEART RATE: 65 BPM | HEIGHT: 65 IN | BODY MASS INDEX: 37.49 KG/M2 | WEIGHT: 225 LBS | SYSTOLIC BLOOD PRESSURE: 151 MMHG

## 2022-01-10 DIAGNOSIS — Z78.9 OTHER SPECIFIED HEALTH STATUS: ICD-10-CM

## 2022-01-10 DIAGNOSIS — Z82.49 FAMILY HISTORY OF ISCHEMIC HEART DISEASE AND OTHER DISEASES OF THE CIRCULATORY SYSTEM: ICD-10-CM

## 2022-01-10 DIAGNOSIS — Z86.79 PERSONAL HISTORY OF OTHER DISEASES OF THE CIRCULATORY SYSTEM: ICD-10-CM

## 2022-01-10 DIAGNOSIS — Z87.19 PERSONAL HISTORY OF OTHER DISEASES OF THE DIGESTIVE SYSTEM: ICD-10-CM

## 2022-01-10 DIAGNOSIS — F17.200 NICOTINE DEPENDENCE, UNSPECIFIED, UNCOMPLICATED: ICD-10-CM

## 2022-01-10 PROCEDURE — 99072 ADDL SUPL MATRL&STAF TM PHE: CPT

## 2022-01-10 PROCEDURE — 99203 OFFICE O/P NEW LOW 30 MIN: CPT

## 2022-01-10 RX ORDER — LISINOPRIL AND HYDROCHLOROTHIAZIDE TABLETS 10; 12.5 MG/1; MG/1
TABLET ORAL
Refills: 0 | Status: ACTIVE | COMMUNITY

## 2022-01-10 RX ORDER — ATORVASTATIN CALCIUM 80 MG/1
TABLET, FILM COATED ORAL
Refills: 0 | Status: ACTIVE | COMMUNITY

## 2022-01-10 NOTE — HISTORY OF PRESENT ILLNESS
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 He was evaluated in the ED at [facility] where he was treated for a finger laceration over the dorsal aspect of the PIP.  He reports that his finger was splinted in extension for 3 weeks and was then referred for hand therapy. He has not made much progress and was advised to follow up with a surgeon. He is unable to extend his finger and complains of pain at the PIP joint.

## 2022-01-10 NOTE — PHYSICAL EXAM
[Normal] : Gait: normal [de-identified] : The patient has no respiratory distress. Mood and affect are normal. The patient is alert and oriented to person, place and time.\par The patient reports no pain with motion of the shoulders, elbows or wrists.  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  There is evidence of soft tissue damage to the dorsum of the finger at the PIP joint.  Extensor function is intact but weak.  Flexor tendon function is intact.  There is hyperextension at the DIP joint and he is unable to extend the terminal 30 degrees at the PIP joint.  Sensation is intact.

## 2022-01-10 NOTE — DISCUSSION/SUMMARY
[de-identified] : The patient has sustained an injury to his right small finger.  He has extensor tendon dysfunction.  He has been treated with splinting and hand therapy so far without success.  I have recommended he be evaluated by the hand service.

## 2022-01-13 ENCOUNTER — APPOINTMENT (OUTPATIENT)
Dept: ORTHOPEDIC SURGERY | Facility: CLINIC | Age: 42
End: 2022-01-13
Payer: OTHER MISCELLANEOUS

## 2022-01-13 VITALS
SYSTOLIC BLOOD PRESSURE: 131 MMHG | DIASTOLIC BLOOD PRESSURE: 81 MMHG | HEART RATE: 80 BPM | HEIGHT: 65 IN | BODY MASS INDEX: 36.65 KG/M2 | WEIGHT: 220 LBS

## 2022-01-13 PROCEDURE — 99215 OFFICE O/P EST HI 40 MIN: CPT

## 2022-01-13 PROCEDURE — 99072 ADDL SUPL MATRL&STAF TM PHE: CPT

## 2022-01-13 NOTE — ASSESSMENT
[Indicate if, in your opinion, the incident that the patient described was the competent medical cause of this injury/illness.] : The incident that the patient described was the competent medical cause of this injury/illness: Yes [Indicate if the patient's complaints are consistent with his/her history of the injury/illness.] : Indicate if the patient's complaints are consistent with his/her history of the injury/illness: Yes [Yes] : Yes, it is consistent [Can the patient return to usual work activities as indicated? If yes, indicate date___] : The patient can return to usual work activities on [unfilled] [Are there any work limitations? (If so, explain and quantify, including the anticipated duration of the limitations)] : There are work limitations. 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  \par Laceration extensor mechanism right little finger.\par Boutonniere deformity right little finger\par Patient require supervised hand therapy 2 times per week for 8 weeks.\par Because patient is 2 months post injury, because the patient has fully healed wound and only moderate pain, the patient is cleared to return to work as of January 17, 2022.\par In a work environment where the patient has cold exposure such as walk-in freezer or refrigerator the patient should wear gloves..\par Patient will be reevaluated in 1 month.\par \par  A lengthy and detailed discussion was held with the patient regarding analysis, treatment, and recommendations. All questions have been answered. At the conclusion the patient expressed acceptance and understanding.\par  [FreeTextEntry5] : 40% right little finger finger [FreeTextEntry7] : wear gloves in freezer and refrigerator [Physical Disability Temporary Partial] : temporarily partial disabled Statement Selected

## 2022-01-13 NOTE — RETURN TO WORK/SCHOOL
[FreeTextEntry1] : \par Diagnosis: Laceration right little finger extensor tendon\par \par The patient is cleared to return to work full duty as of January 17, 2022.\par Patient may return to full duty.  However, patient should wear gloves if exposed to cold such as working in the freezer or refrigerator.\par Patient should be permitted to attend hand therapy sessions 2 times per week for the next 12 weeks.\par \par Tab Rodrigez M.D.\par , Chief Hand Surgery\par Department of Orthopaedic Surgery\par [facility]\par Professor of Orthopaedic Surgery\par Immediate Past President, Faculty [employer]\par [employer] at [facility]\par [id number]

## 2022-01-13 NOTE — REASON FOR VISIT
[Follow-Up Visit] : a follow-up visit for [Other: ______] : provided by [employer] [Interpreters_IDNumber] : [id number] [Interpreters_FullName] : Aj Wright [[id number]] : [place]

## 2022-01-13 NOTE — HISTORY OF PRESENT ILLNESS
[Has the patient missed work because of the injury/illness?] : The patient has missed work because of the injury/illness. [No] : The patient is currently not working. [FreeTextEntry1] : 42 year-old RHD male who works in [email or web address] shop suffered injury right little finger 11/15/2021.  Patient reports a little finger was caught in meat slicing machine.  Patient was treated [facility] emergency department.  Injury was laceration at the dorsal aspect PIP right little finger.  Patient provides history that his finger was splinted for 3 weeks and extension and then he was referred for hand therapy.  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  However the patient failed to attend this appointment and presents today for hand evaluation.\par Patient states that he has not had therapy to this point.\par Patient has not worked since the accident.\par Patient reports swelling of the dorsum of the finger is concerning to him.\par Patient complains that he cannot fully extend the finger.\par During the course of the office examination it became evident that when the patient makes maximum effort to actively extend PIP joint, it extends nearly fully.\par Patient has full composite flexion.\par Patient reports that he has relatively mild discomfort.\par Towards the end of the office encounter the patient stated that he would like to return to work for personal and financial reasons.\par \par Patient provides a photograph on electronic device of the right little finger at the time of injury.\par Photograph shows an ulnar based flap laceration over the PIP joint of little finger.  The flap does not extend much beyond the dorsal midline of the PIP joint.\par From the photograph it is not clear how much of the extensor tendon was actually lacerated.\par Patient shows the finger sutured after treatment in emergency department.\par Patient has slight altered sensation at the injury and just distal to the injury but has normal sensation elsewhere.\par Patient denies any other associated injury or complaint.\par \par Patient sustained a fracture of the right fifth metacarpal many years ago, unrelated.\par  [FreeTextEntry6] : 11/15/2021

## 2022-01-13 NOTE — PHYSICAL EXAM
[de-identified] : Right hand:\par Little finger\par Healed dorsal laceration, V-shaped, ulnarly based.  Approximately 15 mm limbs on each side.  The apex of the laceration is just radial to the midline.\par Radial aspect of finger: Lateral bands and extensor mechanism was not lacerated.\par dorsal flap is swollen.  \par With maximum effort to extend right little finger by patient, PIP extensor lag 15 degrees\par Otherwise active extension without maximum effort: -25 to 30 degrees..\par Passive extension 0 degrees\par DIP hyperextension 20 degrees\par Full active composite flexion.\par No notable MP or DIP findings otherwise.\par No positive findings on the palmar side.\par FDS and FDP tendons intact.\par Remainder of right hand no notable pertinent positive findings\par No pertinent CMC, MP, PIP, or DIP joint contributory finding, other than right little finger PIP joint as noted above.\par Slight prominence dorsum of fifth metacarpal diaphysis from united fracture many years ago.\par Wrist motion full.\par Full composite active flexion all fingers.\par No A1 pulley tenderness and no triggering in any finger.\par \par Left wrist\par Full motion no localized findings\par Left hand\par No pertinent CMC, MP, PIP, or DIP joint contributory finding.\par No A1 pulley tenderness and no triggering in any finger.\par Full composite flexion all fingers\par \par Neurologic: \par Right little finger: Reduced sensation over the flap.\par Slightly reduced light touch sensation distal to the injury as far as DIP extension crease\par Normal light touch sensation distal to DIP extension crease\par Normal light touch palmar surface of little finger\par Normal sensation elsewhere in the right hand\par Motor intact.\par Skin: \par Right little finger: Healed "V" shaped laceration over the dorsum PIP joint \par Swelling of the flap and subcutaneous.  \par Wound is fully healed.  There is no redness or sign of infection.  \par There is some hyperemia in the flap itself.  \par Skin otherwise intact throughout the hand.  \par No cyanosis, clubbing, or rashes.\par Vascular: Radial pulses intact.\par Lymphatic: No streaking or epitrochlear adenopathy.\par The patient is awake, alert, and oriented. Affect appropriate. Cooperative.  [de-identified] : Deferred

## 2022-02-10 ENCOUNTER — APPOINTMENT (OUTPATIENT)
Dept: ORTHOPEDIC SURGERY | Facility: CLINIC | Age: 42
End: 2022-02-10
Payer: OTHER MISCELLANEOUS

## 2022-02-10 PROCEDURE — 99214 OFFICE O/P EST MOD 30 MIN: CPT

## 2022-02-10 PROCEDURE — 99072 ADDL SUPL MATRL&STAF TM PHE: CPT

## 2022-02-14 NOTE — ASSESSMENT
[FreeTextEntry1] : Patient returned to work January 17, 2022.\par Patient is currently working.\par Because of nature of work patient is working in a cold environment, refrigerator and/or freezer, \par the patient must continue to wear gloves on the right hand to try to maintain as much warmth as feasible.\par \par Patient should return for routine follow-up in 3 months.\par Hand therapy is medically indicated necessary 2 times per week for 8 additional weeks.\par Unlikely to regain full extension PIP joint right little finger.\par Some degree of extensor lag is likely to persist indefinitely.\par The swelling on the dorsum of little finger at PIP joint will decrease but may never return to normal.\par May take over 1 year for the swelling to achieve MMI\par \par Lengthy conference with patient when using \par A lengthy and detailed discussion was held with the patient regarding analysis, treatment, and recommendations. All questions have been answered. At the conclusion the patient expressed acceptance, understanding and agreement with the plan. [Indicate if, in your opinion, the incident that the patient described was the competent medical cause of this injury/illness.] : The incident that the patient described was the competent medical cause of this injury/illness: Yes [Indicate if the patient's complaints are consistent with his/her history of the injury/illness.] : Indicate if the patient's complaints are consistent with his/her history of the injury/illness: Yes [Yes] : Yes, it is consistent [Can the patient return to usual work activities as indicated? If yes, indicate date___] : The patient can return to usual work activities on [unfilled] [Are there any work limitations? (If so, explain and quantify, including the anticipated duration of the limitations)] : There are work limitations. [FreeTextEntry7] : Keep hands warm.  Wear gloves.  If painful from cold exposure, patient must leave the cold environment and soak hands in warm water to restore warmth and good blood flow to the right hand.

## 2022-02-14 NOTE — REASON FOR VISIT
[[employer] ] : provided by [employer]   [Interpreters_IDNumber] : [id number] [Interpreters_FullName] : Savanah Robins [TWNoteComboBox1] : [place]

## 2022-02-14 NOTE — PHYSICAL EXAM
[de-identified] : Right hand:\par Little finger\par Healed dorsal laceration, V-shaped, ulnarly based.  Approximately 15 mm limbs on each side.  The apex of the laceration is just radial to the midline.\par Radial aspect of finger: Lateral bands and extensor mechanism was not lacerated.\par dorsal flap is swollen, with increased hyperemia of the flap\par Flap has rapid capillary refill.  No excessive engorgement..  \par With maximum effort to extend right little finger by patient, PIP extensor lag 15 degrees\par Otherwise active extension without maximum effort: -25 to 30 degrees..\par Passive extension 0 degrees\par DIP hyperextension 20 degrees\par Full active composite flexion.\par Minimal discomfort with full active flexion.\par Dorsal flap is fully healed and there is no sign of infection.\par No notable MP or DIP findings otherwise.\par No positive findings on the palmar side.\par FDS and FDP tendons intact.\par Remainder of right hand no notable pertinent positive findings\par No pertinent CMC, MP, PIP, or DIP joint contributory finding, other than right little finger PIP joint as noted above.\par Slight prominence dorsum of fifth metacarpal diaphysis from united fracture many years ago.\par Wrist motion full.\par Full composite active flexion all fingers.\par No A1 pulley tenderness and no triggering in any finger.\par \par Left wrist\par Full motion no localized findings\par Left hand\par No pertinent CMC, MP, PIP, or DIP joint contributory finding.\par No A1 pulley tenderness and no triggering in any finger.\par Full composite flexion all fingers\par \par Neurologic: \par Right little finger: Reduced sensation over the flap.\par Slightly reduced light touch sensation distal to the injury as far as DIP extension crease\par Normal light touch sensation distal to DIP extension crease\par Normal light touch palmar surface of little finger\par Normal sensation elsewhere in the right hand\par Motor intact.\par Skin: \par Right little finger: Healed "V" shaped laceration over the dorsum PIP joint \par Swelling of the flap and subcutaneous.  \par Wound is fully healed.  There is no redness or sign of infection.  \par There is some hyperemia in the flap itself.  \par Skin otherwise intact throughout the hand.  \par No cyanosis, clubbing, or rashes.\par Vascular: Radial pulses intact.\par Lymphatic: No streaking or epitrochlear adenopathy.\par The patient is awake, alert, and oriented. Affect appropriate. Cooperative.

## 2022-02-14 NOTE — HISTORY OF PRESENT ILLNESS
[FreeTextEntry1] : Patient returns for follow-up.  Patient was seen 1/13/2022.\par DOI 11/15/21\par \par Communication with patient in Camb[place]ian and also with [employer]  on video.  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  Patient seen by Sebastien Rogers 1/10/2022 then referred to me for evaluation.\par When evaluated on 1/13/22, the patient had active PIP extension -15 degrees with maximum effort and with lesser effort -25 degrees.  There was swelling of the dorsal skin flap as expected.  Patient did not appear to have a true boutonniere deformity.\par Because of the nature of the patient's work as a  he was instructed to wear gloves in cold environments such as refrigerator or freezer, to do hand therapy 2 times per week and to return in 1 month for reevaluation.\par \par TODAY:\par Pt has returned to work. \par b/o work environment : cold occasionally affects the patient ability to extend little finger.  In general he feels that he is able to work satisfactorily.\par using gloves to keep hands warm.  This is largely successful.\par able to work and do many of the tasks of his job.\par Patient has pain in cold, especially when not wearing gloves.\par Patient has limitations, but reports that he is working to his personal satisfaction.  He avoids heavy repetitive activities, which has been permitted by his employer.\par \par Patient has attempted physical therapy.  \par Apparently patient unable to find hand therapy facility with Cambodian-speaking therapist.

## 2022-05-12 ENCOUNTER — APPOINTMENT (OUTPATIENT)
Dept: ORTHOPEDIC SURGERY | Facility: CLINIC | Age: 42
End: 2022-05-12
Payer: OTHER MISCELLANEOUS

## 2022-05-26 ENCOUNTER — APPOINTMENT (OUTPATIENT)
Dept: ORTHOPEDIC SURGERY | Facility: CLINIC | Age: 42
End: 2022-05-26
Payer: OTHER MISCELLANEOUS

## 2022-05-26 PROBLEM — Z00.00 ENCOUNTER FOR PREVENTIVE HEALTH EXAMINATION: Noted: 2022-05-26

## 2022-06-02 ENCOUNTER — APPOINTMENT (OUTPATIENT)
Dept: ORTHOPEDIC SURGERY | Facility: CLINIC | Age: 42
End: 2022-06-02
Payer: OTHER MISCELLANEOUS

## 2022-06-02 PROCEDURE — 99072 ADDL SUPL MATRL&STAF TM PHE: CPT

## 2022-06-02 PROCEDURE — 99214 OFFICE O/P EST MOD 30 MIN: CPT

## 2022-06-02 NOTE — PHYSICAL EXAM
[de-identified] : Right hand:\par Little finger\par fully healed dorsal laceration, V-shaped, ulnarly based.  Approximately 15 mm limbs on each side.  \par The apex of the laceration is just radial to the midline.\par Radial aspect of finger: Lateral bands and extensor mechanism was not lacerated.\par dorsal flap is swollen, with increased hyperemia of the flap.\par Flap has rapid capillary refill.  No excessive engorgement..  \par With maximum effort to extend right little finger by patient, PIP extensor lag 30 degrees\par Active extension without maximum effort: -30 degrees..\par Passive extension 0 degrees\par Active PIP flexion 95 degrees with pain\par DIP hyperextension 20 degrees\par DIP flexion 60 degrees with pain at maximum flexion\par MP 5 motion 0/70 degrees.\par No triggering little finger\par No A1 pulley tenderness and no triggering in any finger, right hand.\par Minimal discomfort with full active flexion.\par Dorsal flap is fully healed and there is no sign of infection.\par No notable MP or DIP findings otherwise.\par No positive findings on the palmar side.\par FDS and FDP tendons intact.\par Remainder of right hand no notable pertinent positive findings\par No pertinent CMC, MP, PIP, or DIP joint contributory finding, other than right little finger PIP joint as noted above.\par Slight prominence dorsum of fifth metacarpal diaphysis from united fracture many years ago.\par Wrist motion full.\par Good composite active flexion all fingers.\par No A1 pulley tenderness and no triggering in any finger.\par \par Left wrist\par Full motion no localized findings\par Left hand\par Little finger\par MP 0/80 degrees\par PIP +20/105 degrees\par DIP 0/85 degrees\par No pertinent CMC, MP, PIP, or DIP joint contributory finding.\par No A1 pulley tenderness and no triggering in any finger.\par Full composite flexion all fingers\par \par Neurologic: \par Right little finger: Reduced sensation over the flap.\par Slightly reduced light touch sensation distal to the injury as far as DIP extension crease\par Normal light touch sensation distal to DIP extension crease\par Normal light touch palmar surface of little finger\par Normal sensation elsewhere in the right hand\par Motor intact.\par Skin: \par Right little finger: Healed "V" shaped laceration over the dorsum PIP joint \par Swelling of the flap and subcutaneous.  \par Wound is fully healed.  There is no redness or sign of infection.  \par There is some hyperemia in the flap itself.  \par Skin otherwise intact throughout the hand.  \par No cyanosis, clubbing, or rashes.\par Vascular: Radial pulses intact.\par Lymphatic: No streaking or epitrochlear adenopathy.\par The patient is awake, alert, and oriented. Affect appropriate. Cooperative.  [de-identified] : Deferred

## 2022-06-02 NOTE — HISTORY OF PRESENT ILLNESS
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 Patient seen by Sebastien Rogers 1/10/2022 then referred to me for evaluation.\par When evaluated on 1/13/22, the patient had active PIP extension -15 degrees with maximum effort and with lesser effort -25 degrees. There was swelling of the dorsal skin flap as expected. Patient did not appear to have a true boutonniere deformity.\par Because of the nature of the patient's work as a  he was instructed to wear gloves in cold environments such as refrigerator or freezer, to do hand therapy 2 times per week and to return in 1 month for reevaluation.\par Patient seen 2/10/2022.  The patient was back to work.  Patient was instructed wear gloves in cold environment at work.  Patient was sent for hand therapy to improve range of motion and to see if the extensor lag at the PIP joint can be improved.\par \par Patient was scheduled for May 12, 2022 but canceled appointment.  \par Patient was scheduled for May 26, 2022, but appointment was canceled.\par TODAY:\par Date of injury 11/15/2021.\par Pt is working. \par Patient has pain when working and moving the finger.\par Pt wears a glove in cold exposure.\par Pt is able to do the requirements of his job.\par Sometimes has comfort and pain.\par  [FreeTextEntry2] : randi [FreeTextEntry4] : suture of wound [FreeTextEntry3] : repetitive activities with fingers in cold environment. [Yes] : The patient is currently working. [FreeTextEntry6] : 1/14/2022

## 2022-06-02 NOTE — ASSESSMENT
[FreeTextEntry1] : Patient returned to work January 17, 2022.\par Patient is currently working.\par The patient is cleared to continue working.\par Because of nature of work, patient is working in a cold environment, refrigerator and/or freezer, \par the patient has pain and discomfort frequently.  Pain is not disabling and patient is able to perform the test required of his job. \par I recommend that the patient wear gloves on the right hand to maintain as much warmth as feasible.\par \par Patient has completed a course of hand therapy.\par Range of motion has not changed to any notable degree.\par Patient has not regained full active extension of PIP joint and\par  it is my opinion that the patient is very unlikely to regain full extension PIP joint right little finger.\par Some degree of extensor lag is likely to persist indefinitely.\par The swelling on the dorsum of little finger at PIP joint will decrease but may never return to normal.\par May take over 1 year for the swelling to achieve MMI\par \par Lengthy conference with patient using \par A lengthy and detailed discussion was held with the patient regarding analysis, treatment, and recommendations. All questions have been answered. At the conclusion the patient expressed acceptance, understanding and agreement with the plan. [Indicate if, in your opinion, the incident that the patient described was the competent medical cause of this injury/illness.] : The incident that the patient described was the competent medical cause of this injury/illness: Yes [Indicate if the patient's complaints are consistent with his/her history of the injury/illness.] : Indicate if the patient's complaints are consistent with his/her history of the injury/illness: Yes [Yes] : Yes, it is consistent [Can the patient return to usual work activities as indicated? If yes, indicate date___] : The patient can return to usual work activities on [unfilled] [Are there any work limitations? (If so, explain and quantify, including the anticipated duration of the limitations)] : There are work limitations. [FreeTextEntry5] : 33% right little finger [FreeTextEntry6] : [place] Worker's Compensation impairment guide [FreeTextEntry7] : Wear gloves for warmth as needed during cold exposure.

## 2022-06-02 NOTE — REASON FOR VISIT
[[employer] ] : provided by [employer]   [Interpreters_IDNumber] : [id number] [Interpreters_FullName] : Rhoda Mann [Interpreters_Relationshiptopatient] : none [TWNoteComboBox1] : Ug[place]an

## 2022-11-02 NOTE — DISCHARGE NOTE ADULT - VISION (WITH CORRECTIVE LENSES IF THE PATIENT USUALLY WEARS THEM):
Normal vision: sees adequately in most situations; can see medication labels, newsprint Former smoker

## 2022-11-30 ENCOUNTER — APPOINTMENT (OUTPATIENT)
Dept: ORTHOPEDIC SURGERY | Facility: CLINIC | Age: 42
End: 2022-11-30

## 2022-11-30 DIAGNOSIS — S69.91XD UNSPECIFIED INJURY OF RIGHT WRIST, HAND AND FINGER(S), SUBSEQUENT ENCOUNTER: ICD-10-CM

## 2022-11-30 DIAGNOSIS — S66.821D LACERATION OF OTHER SPECIFIED MUSCLES, FASCIA AND TENDONS AT WRIST AND HAND LEVEL, RIGHT HAND, SUBSEQUENT ENCOUNTER: ICD-10-CM

## 2022-11-30 DIAGNOSIS — M77.8 OTHER ENTHESOPATHIES, NOT ELSEWHERE CLASSIFIED: ICD-10-CM

## 2022-11-30 DIAGNOSIS — M20.021 BOUTONNIERE DEFORMITY OF RIGHT FINGER(S): ICD-10-CM

## 2022-11-30 DIAGNOSIS — S61.401D LACERATION OF OTHER SPECIFIED MUSCLES, FASCIA AND TENDONS AT WRIST AND HAND LEVEL, RIGHT HAND, SUBSEQUENT ENCOUNTER: ICD-10-CM

## 2022-11-30 DIAGNOSIS — M79.89 OTHER SPECIFIED SOFT TISSUE DISORDERS: ICD-10-CM

## 2022-11-30 PROCEDURE — 99455 WORK RELATED DISABILITY EXAM: CPT | Mod: 25

## 2022-11-30 PROCEDURE — 99072 ADDL SUPL MATRL&STAF TM PHE: CPT

## 2022-11-30 PROCEDURE — 20550 NJX 1 TENDON SHEATH/LIGAMENT: CPT | Mod: F9

## 2022-11-30 NOTE — PROCEDURE
[] : Trigger Finger: After full discussion of treatment alternatives, and associated risks, complication, limitations, and expectations, and with patient verbal consent following verbal timeout site verification, steroid injections were administered. Following sterile prep with sterile method, 0.5cc Bupivicaine and 6 mg celestone generic were injected into the flexor tendon sheaths of the right [5th] : 5th finger

## 2022-12-03 PROBLEM — M77.8 TENDINITIS OF FINGER OF RIGHT HAND: Status: ACTIVE | Noted: 2022-12-03

## 2022-12-03 NOTE — ASSESSMENT
[FreeTextEntry1] : ,Patient returned to work January 17, 2022.\par Patient is currently working.\par Because of nature of work, patient is working in a cold environment, refrigerator and/or freezer, \par the patient has pain and discomfort frequently.  Pain is not disabling.\par The patient states that he is able to perform the requirements of his job as a . 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.\par Patient has pain associated.\par Patient is able to grab objects and make a fist.\par Patient has lost full extension of PIP joint and has slightly reduced DIP flexion as result of the injury.\par Patient describes sensory deficit on the dorsum of finger which is clearly related to the laceration.\par Patient newly describes loss of sensation on the palmar surface of right little finger the explanation for which is unclear to me.\par Patient does have A1 pulley tenderness right little finger without triggering and this was treated with cortisone injection today because of likely tendinitis without overt trigger finger.\par \par  it is my opinion that the patient will not regain full extension PIP joint right little finger.\par It is my opinion that the patient will have some degree of permanent extensor lag of the PIP joint.  \par This can be classified as a boutonniere deformity.\par In my opinion the patient has limitation of DIP flexion which is also secondary to boutonniere deformity.\par The swelling on the dorsum of little finger at PIP joint persists and is likely to continue.\par For all intents and purposes the patient is reached MMI of the right little finger.\par Patient was treated with cortisone injection for flexor tendinitis right little finger today.  Patient may require additional treatment in the future but this cannot be determined today.\par \par Lengthy conference with patient using native fluent Slovenian speaking full-time , medical assistant, Chanell.\par I have advised patient that I will calculate schedule loss of use according to the [place] Worker's Compensation permanency guidelines and I will submit a report from measurements taken on today's exam.\par The patient has expressed acceptance and understanding of the above.\par If little finger tendinitis recurs the patient may require future treatment for this condition but today's cortisone injection may be definitive treatment.\par A lengthy and detailed discussion was held with the patient regarding analysis, treatment, and recommendations. All questions have been answered. At the conclusion the patient expressed acceptance, understanding and agreement with the plan.\par \par SCHEDULED LOSS OF USE calculation,  right little finger:\par \par EXAMINATION:\par dorsal flap remains swollen, with increased hyperemia of the flap.\par Flap has rapid capillary refill.  No excessive engorgement..  \par Active extension: PIP extensor lag 45 degrees\par Active extension without maximum effort: - 45 degrees..\par Passive extension 0 degrees\par Active PIP flexion 95 degrees with pain\par DIP hyperextension 20 degrees\par DIP flexion 60 degrees with pain at maximum flexion\par MP 5 motion 0/70 degrees.\par Little finger A1 pulley tender.  No active or provocable triggering\par FDS and FDP tendons intact.\par \par LEFT hand\par Little finger, active range of motion:\par MP 0/80 degrees\par PIP +20/105 degrees\par DIP 0/85 degrees\par \par Pinch strength right 21 pounds\par Pinch strength left 17 pounds\par  strength right 88 pounds\par Left  strength 92 pounds\par \par Neurologic: \par Right little finger: Reduced sensation dorsally over the flap extending to the distal end of little finger.\par Palmar sensation: Subjectively light touch diminished entire palmar surface distal to the midportion of proximal segment\par No evidence of digital nerve laceration.\par \par FINAL CALCULATION SLU:\par \par Right little finger:\par DIP E/F (active): +20/60°\par PIP E/F (active): -45/95°\par MP E/F (active): 0/70°\par \par Left little finger\par DIP E/F (active): 0/85°\par PIP E/F (active): +2/105°\par MP E/F (active): 0/80°\par \par Pinch strength right: 21 pounds\par Pinch strength left: 17 pounds\par  strength right: 88 pounds\par  strength left: 92 pounds\par \par Right little finger sensation\par Dorsal sensation diminished to light touch distal to the area of laceration.\par Palmar sensation described as decreased to light touch (explanation for this without injury to volar digital nerves is unclear.)\par \par SLU Calculation:\par DIP: 10%\par PIP: 25%\par MP: 10%\par Dorsal sensory loss and COLD INTOLERANCE especially meaningful for  working in refrigerator and freezer: 10%\par \par TOTAL SLU:  right little finger:\par 10% +25% +10% +10% = 55%\par \par

## 2022-12-03 NOTE — PHYSICAL EXAM
[de-identified] : Right hand:\par Little finger\par fully healed dorsal laceration, V-shaped, ulnarly based.  Approximately 15 mm limbs on each side.  \par The apex of the laceration is just radial to the midline.\par Radial aspect of finger: Lateral bands and extensor mechanism clinically not lacerated.\par dorsal flap remains swollen, with increased hyperemia of the flap.\par Flap has rapid capillary refill.  No excessive engorgement..  \par With maximum effort to extend right little finger by patient, PIP extensor lag 45 degrees\par Active extension without maximum effort: -45 degrees..\par Passive extension 0 degrees\par Active PIP flexion 95 degrees with pain\par DIP hyperextension 20 degrees\par DIP flexion 60 degrees with pain at maximum flexion\par MP 5 motion 0/70 degrees.\par Little finger A1 pulley tender.  No active or provocable triggering\par No A1 other pulley tenderness and no triggering in any other finger, right hand.\par Minimal discomfort with full active flexion.\par Dorsal flap is fully healed and there is no sign of infection.\par No notable MP or DIP findings otherwise.\par No positive findings on the palmar side.\par FDS and FDP tendons intact.\par Remainder of right hand no notable pertinent positive findings\par No pertinent CMC, MP, PIP, or DIP joint contributory finding, other than right little finger PIP joint as noted above.\par Slight prominence dorsum of fifth metacarpal diaphysis from united fracture many years ago.\par Wrist motion full.\par Good composite active flexion all fingers.\par No A1 pulley tenderness and no triggering in any finger.\par \par Pinch strength right 21 pounds\par Pinch strength left 17 pounds\par  strength right 88 pounds\par Left  strength 92 pounds\par \par Left wrist\par Full motion no localized findings\par Left hand\par Little finger, active range of motion:\par MP 0/80 degrees\par PIP +20/105 degrees\par DIP 0/85 degrees\par No pertinent CMC, MP, PIP, or DIP joint contributory finding.\par No A1 pulley tenderness and no triggering in any finger.\par Full composite flexion all fingers\par \par Neurologic: \par Right little finger: Reduced sensation dorsally over the flap.\par Slightly reduced light touch sensation distal to area of injury, as far as tip of finger\par Decreased subjective light touch sensation distal to PIP on the palmar surface\par There is no scar evidence of palmar digital nerve injury such as RDN or UDN\par Normal sensation elsewhere in the right hand\par Motor intact, throughout except for limited motion in 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  \par Wound is fully healed.  There is no redness or sign of infection.  \par There is some hyperemia in the flap itself.  \par Skin otherwise intact throughout the hand.  \par No cyanosis, clubbing, or rashes.\par Vascular: Radial pulses intact.\par Lymphatic: No streaking or epitrochlear adenopathy.\par The patient is awake, alert, and oriented. Affect appropriate. Cooperative.  [de-identified] : X-rays deferred

## 2022-12-03 NOTE — HISTORY OF PRESENT ILLNESS
[Has the patient missed work because of the injury/illness?] : The patient has missed work because of the injury/illness. [Yes] : The patient is currently working. [Resumed limited work of any kind: ______] : The patient resumed limited work on [unfilled]. [Resumed full work: ______] : The patient resumed full work on [unfilled]. 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 \par Date of injury 11/15/2021.\par Patient returned to work January 17, 2022.\par \par Finger was splinted for 3 weeks and extension. Patient seen by Sebastien Rogers 1/10/2022 then referred to me for evaluation.\par When evaluated on 1/13/22, the patient had active PIP extension -15 degrees with maximum effort and with lesser effort -25 degrees. There was swelling of the dorsal skin flap as expected. Patient did not appear to have a true boutonniere deformity.\par Because of the nature of the patient's work as a  he was instructed to wear gloves in cold environments such as refrigerator or freezer, to do hand therapy 2 times per week and to return in 1 month for reevaluation.\par Patient seen 2/10/2022.  The patient was back to work.  Patient was instructed wear gloves in cold environment at work.  Patient was sent for hand therapy to improve range of motion and to see if the extensor lag at the PIP joint can be improved.\par \par Patient was scheduled for May 12, 2022 but canceled appointment.  \par Patient was scheduled for May 26, 2022, but appointment was canceled.\par Patient most recently seen 6/2/2022.  Patient was working as a  at that time.\par \par Today:\par Full-time native [place] speaking , Chanell LO, served as . I also spoke in [place] with patient.\par \par Patient has ongoing pain right little finger especially at PIP and DIP joints.\par Pt reports numbness on dorsal surface\par Also reports numbness on palmar surface today.\par Of note, patient did not describe numbness on the palmar surface of little finger prior to today.\par Patient denies any intervening injury or laceration on the palmar surface of little finger.\par Pt reports pain on palmar aspect little finger at A1 pulley.\par Pt not reporting triggering, although during the exam there was tenderness at little finger A1 pulley without active or provocable triggering\par Patient does not have triggering of the fingers\par Patient has normal sensation of the fingers.\par Patient denies pain in any of the fingers\par Patient continues to complain of pain when exposed to cold, such as working in refrigerator or freezer in his job as a .\par Patient states that he returned to work as a  and has been working full-time as a  although he has pain.. 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 [FreeTextEntry4] : suture of wound

## 2024-09-17 NOTE — ED PROVIDER NOTE - NS ED MD TWO NIGHTS YN
[FreeTextEntry1] : Xrays reviewed with patient and patient's mother Treatment options discussed  Finger splint applied today Discussed ice and elevation otc anti-inflammatories as needed Follow up with hand/wrist specialist in 1 week   Yes